# Patient Record
Sex: MALE | URBAN - METROPOLITAN AREA
[De-identification: names, ages, dates, MRNs, and addresses within clinical notes are randomized per-mention and may not be internally consistent; named-entity substitution may affect disease eponyms.]

---

## 2019-07-11 ENCOUNTER — OFFICE VISIT (OUTPATIENT)
Dept: URBAN - METROPOLITAN AREA CLINIC 23 | Facility: CLINIC | Age: 49
End: 2019-07-11
Payer: MEDICAID

## 2019-07-11 DIAGNOSIS — E11.9 TYPE 2 DIABETES MELLITUS WITHOUT COMPLICATIONS: Primary | ICD-10-CM

## 2019-07-11 PROCEDURE — 92004 COMPRE OPH EXAM NEW PT 1/>: CPT | Performed by: OPTOMETRIST

## 2019-07-11 ASSESSMENT — INTRAOCULAR PRESSURE
OS: 13
OD: 16

## 2019-07-11 ASSESSMENT — KERATOMETRY
OS: 40.25
OD: 40.25

## 2019-07-11 NOTE — IMPRESSION/PLAN
Impression: Type 2 diabetes mellitus without complications: E21.0. OU. Plan: Discussed diagnosis in detail with patient. Advised and emphasized patient of blood sugar control. Discussed risks of progression. Poor compliance can lead to blindness. No signs of diabetic retinopathy. Reassured patient of condition and treatment. Will continue to observe condition and or symptoms.